# Patient Record
(demographics unavailable — no encounter records)

---

## 2025-01-10 NOTE — HISTORY OF PRESENT ILLNESS
[de-identified] : 01/10/2025:  Date of Injury/Onset: 2020 Pain: At Rest: 2/10 With Activity: 7/10 Mechanism of injury: Sprained ankle in 2020, didn't seek any tx. Pain exacerbated last June 2024 after resuming running. Pain and swelling in medial aspect of ankle. Saw podiatrist, had xr's and MRI @[address] (only report available today). Denies PT, injections or recent tx .  This is NOT a Work Related Injury being treated under Worker's Compensation. This is NOT an athletic injury occurring associated with an interscholastic or organized sports team. Quality of symptoms: sharp Improves with: rest Worse with: ROM, running Prior treatment: MRI Prior Imaging: MRI [address] 08/04/2024 Reports Available For Review Today: MRI RT ankle.  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

## 2025-01-10 NOTE — DISCUSSION/SUMMARY
[de-identified] : Acute on chronic r ankle pain in the setting of ankle instability with associated medial gutter impingement Rec: Tall camboot and Mobic 3 weeks  next visit: if improving aso and PT if fails non-op ankle scope, debridement, amada

## 2025-01-10 NOTE — IMAGING
[de-identified] : Patient ambulates with a Antalgic gait   Right Foot and Ankle: Inspection: Erythema: None Swelling: mild swelling laterally, anteriorly, medially Ecchymosis: None Abrasions: None Rashes: None Surgical Scars: None Effusion: None Atrophy: None Deformity: None Pes [place] Valgus: Negative Pes Cavus: Negative Hallux Valgus: Negative Clawtoe/Hammertoe: Negative  Palpation: Crepitus: None Proximal Fibula: Nontender Distal Fibula: Nontender Medial Malleolus: tender Lateral Malleolus: tender AITFL: nontender PITFL: Nontender ATFL: nontender CFL: nontender Deltoid: tender Calcaneus: Nontender Talar Head/Neck: Nontender Lateral Process of Talus: Nontender Anterior Facet of Calcaneus: Nontender Peroneal Tendons: nontender Posterior Tibialis: Nontender Achilles Tendon: Nontender & Intact Achilles Insertion: Nontender Retrocalcaneal Bursa: Nontender Anterior Capsule: tender Subtalar Joint: Nontender Talonavicular Joint: Nontender Calcaneocuboid Joint: Nontender Heel pad: Nontender Medial Tubercle of Calcaneus: Nontender Plantar Fascia: Nontender Midfoot: Nontender Forefoot: Nontender Sesamoids: Nontender  ROM: Ankle Dorsiflexion: 0 degrees Ankle Plantar Flexion: 35 degrees Eversion: 15 degrees Inversion: 25 degrees, painful  Motor: Dorsiflexion: 5 out of 5 Plantar Flexion: 5 out of 5 Inversion: 5  out of 5 Eversion: 5 out of 5, painful  Provocative Testing: Anterior Drawer: Positive Syndesmosis Squeeze Test: Negative Circumduction test: Negative Resisted ER: Painful  Axial Grind 1st MTP: Painless Neurologic Exam: L4-S1 Sensation: Grossly Intact Tinels: Negative  Vascular Exam/Pulses: Dorsalis Pedis: 2+ Posterior Tibialis: 2+ Capillary Refill: <2 Seconds Other Exams: None Pertinent Contralateral Findings: None

## 2025-02-07 NOTE — HISTORY OF PRESENT ILLNESS
[de-identified] : 01/10/2025:  Date of Injury/Onset: 2020 Pain: At Rest: 2/10 With Activity: 7/10 Mechanism of injury: Sprained ankle in 2020, didn't seek any tx. Pain exacerbated last June 2024 after resuming running. Pain and swelling in medial aspect of ankle. Saw podiatrist, had xr's and MRI [facility] (only report available today). Denies PT, injections or recent tx .  This is NOT a Work Related Injury being treated under Worker's Compensation. This is NOT an athletic injury occurring associated with an interscholastic or organized sports team. Quality of symptoms: sharp Improves with: rest Worse with: ROM, running Prior treatment: MRI Prior Imaging: MRI [address] 08/04/2024 Reports Available For Review Today: MRI RT ankle.  Out of work/sport: No School/Sport/Position/Occupation: [employer]   02/07/25: Patient here to follow up on his right ankle pain. Patient had been compliant with cam boot and taking meloxicam. He reports minor improvement with persistent symptoms.  presents with images of MRI ([address] [facility]). no written report

## 2025-02-07 NOTE — DISCUSSION/SUMMARY
[de-identified] : Acute on chronic r ankle pain in the setting of ankle instability with associated medial gutter impingement Rec: Tall camboot and Mobic 3 weeks  2/7/2025 Improved examination, ankle feels stable, much less pain Rec: ASO , sleep in boot, PT no impact, no inversion RTC 4 weeks   next visit: if improving aso start light impact exercises  if fails consider R ankle csi v ankle scope, debridementamada

## 2025-02-07 NOTE — DATA REVIEWED
[FreeTextEntry1] : 3 v r ankle neg for fx or dislocation medial gutter impingement  MR R ankle deltoid sprain, deep torn ATFL + effusion no OLT

## 2025-02-07 NOTE — IMAGING
[de-identified] : Patient ambulates with a normal gait   Right Foot and Ankle: Inspection: Erythema: None Swelling: no swelling laterally, anteriorly, medially Ecchymosis: None Abrasions: None Rashes: None Surgical Scars: None Effusion: None Atrophy: None Deformity: None Pes [place] Valgus: Negative Pes Cavus: Negative Hallux Valgus: Negative Clawtoe/Hammertoe: Negative  Palpation: Crepitus: None Proximal Fibula: Nontender Distal Fibula: Nontender Medial Malleolus: nontender Lateral Malleolus: nontender AITFL: nontender PITFL: Nontender ATFL: nontender CFL: nontender Deltoid: tender, but improved Calcaneus: Nontender Talar Head/Neck: Nontender Lateral Process of Talus: Nontender Anterior Facet of Calcaneus: Nontender Peroneal Tendons: nontender Posterior Tibialis: Nontender Achilles Tendon: Nontender & Intact Achilles Insertion: Nontender Retrocalcaneal Bursa: Nontender Anterior Capsule: nontender Subtalar Joint: Nontender Talonavicular Joint: Nontender Calcaneocuboid Joint: Nontender Heel pad: Nontender Medial Tubercle of Calcaneus: Nontender Plantar Fascia: Nontender Midfoot: Nontender Forefoot: Nontender Sesamoids: Nontender  ROM: Ankle Dorsiflexion: 0 degrees Ankle Plantar Flexion: 35 degrees Eversion: 15 degrees Inversion: 25 degrees, painless  Motor: Dorsiflexion: 5 out of 5 Plantar Flexion: 5 out of 5 Inversion: 5  out of 5 Eversion: 5 out of 5, painless  Provocative Testing: Anterior Drawer: Positive, but improved Syndesmosis Squeeze Test: Negative Circumduction test: Negative Resisted ER: Painless  Axial Grind 1st MTP: Painless Neurologic Exam: L4-S1 Sensation: Grossly Intact Tinels: Negative  Vascular Exam/Pulses: Dorsalis Pedis: 2+ Posterior Tibialis: 2+ Capillary Refill: <2 Seconds Other Exams: None Pertinent Contralateral Findings: None